# Patient Record
Sex: FEMALE | Race: WHITE
[De-identification: names, ages, dates, MRNs, and addresses within clinical notes are randomized per-mention and may not be internally consistent; named-entity substitution may affect disease eponyms.]

---

## 2017-07-25 ENCOUNTER — HOSPITAL ENCOUNTER (OUTPATIENT)
Dept: HOSPITAL 61 - SURGPAT | Age: 62
Discharge: HOME | End: 2017-07-25
Attending: ORTHOPAEDIC SURGERY
Payer: COMMERCIAL

## 2017-07-25 DIAGNOSIS — Z01.818: Primary | ICD-10-CM

## 2017-07-25 LAB
ALBUMIN SERPL-MCNC: 3.3 G/DL (ref 3.4–5)
ANION GAP SERPL CALC-SCNC: 10 MMOL/L (ref 6–14)
APTT BLD: 29 SEC (ref 24–38)
BASOPHILS # BLD AUTO: 0.1 X10^3/UL (ref 0–0.2)
BASOPHILS NFR BLD: 1 % (ref 0–3)
BUN SERPL-MCNC: 12 MG/DL (ref 7–20)
CALCIUM SERPL-MCNC: 8.3 MG/DL (ref 8.5–10.1)
CHLORIDE SERPL-SCNC: 101 MMOL/L (ref 98–107)
CO2 SERPL-SCNC: 30 MMOL/L (ref 21–32)
CREAT SERPL-MCNC: 1.1 MG/DL (ref 0.6–1)
EOSINOPHIL NFR BLD: 1 % (ref 0–3)
ERYTHROCYTE [DISTWIDTH] IN BLOOD BY AUTOMATED COUNT: 14.4 % (ref 11.5–14.5)
GFR SERPLBLD BASED ON 1.73 SQ M-ARVRAT: 50.3 ML/MIN
GLUCOSE SERPL-MCNC: 132 MG/DL (ref 70–99)
HCT VFR BLD CALC: 41.5 % (ref 36–47)
HGB BLD-MCNC: 13.9 G/DL (ref 12–15.5)
INR PPP: 1 (ref 0.8–1.1)
LYMPHOCYTES # BLD: 2.6 X10^3/UL (ref 1–4.8)
LYMPHOCYTES NFR BLD AUTO: 28 % (ref 24–48)
MCH RBC QN AUTO: 29 PG (ref 25–35)
MCHC RBC AUTO-ENTMCNC: 34 G/DL (ref 31–37)
MCV RBC AUTO: 86 FL (ref 79–100)
MONOCYTES NFR BLD: 5 % (ref 0–9)
NEUTROPHILS NFR BLD AUTO: 65 % (ref 31–73)
PLATELET # BLD AUTO: 200 X10^3/UL (ref 140–400)
POTASSIUM SERPL-SCNC: 3.1 MMOL/L (ref 3.5–5.1)
PROTHROMBIN TIME: 12.7 SEC (ref 11.7–14)
RBC # BLD AUTO: 4.82 X10^6/UL (ref 3.5–5.4)
SODIUM SERPL-SCNC: 141 MMOL/L (ref 136–145)
WBC # BLD AUTO: 9.3 X10^3/UL (ref 4–11)

## 2017-07-25 PROCEDURE — 85027 COMPLETE CBC AUTOMATED: CPT

## 2017-07-25 PROCEDURE — 80048 BASIC METABOLIC PNL TOTAL CA: CPT

## 2017-07-25 PROCEDURE — 85610 PROTHROMBIN TIME: CPT

## 2017-07-25 PROCEDURE — 82040 ASSAY OF SERUM ALBUMIN: CPT

## 2017-07-25 PROCEDURE — 36415 COLL VENOUS BLD VENIPUNCTURE: CPT

## 2017-07-25 PROCEDURE — 93005 ELECTROCARDIOGRAM TRACING: CPT

## 2017-07-25 PROCEDURE — 71020: CPT

## 2017-07-25 PROCEDURE — 87641 MR-STAPH DNA AMP PROBE: CPT

## 2017-07-25 PROCEDURE — 85730 THROMBOPLASTIN TIME PARTIAL: CPT

## 2017-07-25 PROCEDURE — 85651 RBC SED RATE NONAUTOMATED: CPT

## 2017-11-07 ENCOUNTER — HOSPITAL ENCOUNTER (OUTPATIENT)
Dept: HOSPITAL 61 - SURGPAT | Age: 62
Discharge: HOME | End: 2017-11-07
Attending: ORTHOPAEDIC SURGERY
Payer: COMMERCIAL

## 2017-11-07 DIAGNOSIS — Z96.651: ICD-10-CM

## 2017-11-07 DIAGNOSIS — M17.11: ICD-10-CM

## 2017-11-07 DIAGNOSIS — Z01.818: Primary | ICD-10-CM

## 2017-11-07 LAB
ALBUMIN SERPL-MCNC: 3.1 G/DL (ref 3.4–5)
ANION GAP SERPL CALC-SCNC: 10 MMOL/L (ref 6–14)
APTT BLD: 26 SEC (ref 24–38)
BACTERIA #/AREA URNS HPF: (no result) /HPF
BASOPHILS # BLD AUTO: 0.1 X10^3/UL (ref 0–0.2)
BASOPHILS NFR BLD: 1 % (ref 0–3)
BILIRUB UR QL STRIP: NEGATIVE
BUN SERPL-MCNC: 12 MG/DL (ref 7–20)
CALCIUM SERPL-MCNC: 8.7 MG/DL (ref 8.5–10.1)
CHLORIDE SERPL-SCNC: 97 MMOL/L (ref 98–107)
CO2 SERPL-SCNC: 30 MMOL/L (ref 21–32)
CREAT SERPL-MCNC: 1.1 MG/DL (ref 0.6–1)
EOSINOPHIL NFR BLD: 1 % (ref 0–3)
ERYTHROCYTE [DISTWIDTH] IN BLOOD BY AUTOMATED COUNT: 14.4 % (ref 11.5–14.5)
GFR SERPLBLD BASED ON 1.73 SQ M-ARVRAT: 50.2 ML/MIN
GLUCOSE SERPL-MCNC: 195 MG/DL (ref 70–99)
GLUCOSE UR STRIP-MCNC: NEGATIVE MG/DL
HCT VFR BLD CALC: 41 % (ref 36–47)
HGB BLD-MCNC: 13.7 G/DL (ref 12–15.5)
INR PPP: 1.1 (ref 0.8–1.1)
LYMPHOCYTES # BLD: 2.6 X10^3/UL (ref 1–4.8)
LYMPHOCYTES NFR BLD AUTO: 32 % (ref 24–48)
MCH RBC QN AUTO: 29 PG (ref 25–35)
MCHC RBC AUTO-ENTMCNC: 34 G/DL (ref 31–37)
MCV RBC AUTO: 87 FL (ref 79–100)
MONOCYTES NFR BLD: 6 % (ref 0–9)
NEUTROPHILS NFR BLD AUTO: 60 % (ref 31–73)
NITRITE UR QL STRIP: NEGATIVE
PH UR STRIP: 7 [PH]
PLATELET # BLD AUTO: 246 X10^3/UL (ref 140–400)
POTASSIUM SERPL-SCNC: 2.7 MMOL/L (ref 3.5–5.1)
PROT UR STRIP-MCNC: NEGATIVE MG/DL
PROTHROMBIN TIME: 13.3 SEC (ref 11.7–14)
RBC # BLD AUTO: 4.71 X10^6/UL (ref 3.5–5.4)
RBC #/AREA URNS HPF: 0 /HPF (ref 0–2)
SODIUM SERPL-SCNC: 137 MMOL/L (ref 136–145)
SP GR UR STRIP: 1.02
SQUAMOUS #/AREA URNS LPF: (no result) /LPF
UROBILINOGEN UR-MCNC: 1 MG/DL
WBC # BLD AUTO: 8.2 X10^3/UL (ref 4–11)
WBC #/AREA URNS HPF: (no result) /HPF (ref 0–4)

## 2017-11-07 PROCEDURE — 85730 THROMBOPLASTIN TIME PARTIAL: CPT

## 2017-11-07 PROCEDURE — 87086 URINE CULTURE/COLONY COUNT: CPT

## 2017-11-07 PROCEDURE — 81001 URINALYSIS AUTO W/SCOPE: CPT

## 2017-11-07 PROCEDURE — 85651 RBC SED RATE NONAUTOMATED: CPT

## 2017-11-07 PROCEDURE — 85025 COMPLETE CBC W/AUTO DIFF WBC: CPT

## 2017-11-07 PROCEDURE — 36415 COLL VENOUS BLD VENIPUNCTURE: CPT

## 2017-11-07 PROCEDURE — 85610 PROTHROMBIN TIME: CPT

## 2017-11-07 PROCEDURE — 80048 BASIC METABOLIC PNL TOTAL CA: CPT

## 2017-11-07 PROCEDURE — 87641 MR-STAPH DNA AMP PROBE: CPT

## 2017-11-07 PROCEDURE — 82040 ASSAY OF SERUM ALBUMIN: CPT

## 2018-03-12 ENCOUNTER — HOSPITAL ENCOUNTER (OUTPATIENT)
Dept: HOSPITAL 63 - PMG | Age: 63
Discharge: HOME | End: 2018-03-12
Attending: FAMILY MEDICINE
Payer: COMMERCIAL

## 2018-03-12 DIAGNOSIS — R60.0: ICD-10-CM
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
## 2018-03-12 DIAGNOSIS — S89.91XD: Primary | ICD-10-CM

## 2018-03-12 PROCEDURE — 73560 X-RAY EXAM OF KNEE 1 OR 2: CPT

## 2018-03-12 NOTE — RAD
Right knee, 3 views, 3/12/2018:



History: Knee pain since recent injury



A total knee prosthesis is in place in satisfactory position.  No fracture or

dislocation is identified.  There is mild streaky subcutaneous edema.



IMPRESSION:

1.  The right total knee prosthesis is in satisfactory position.

2.  No acute bony abnormality is detected.

## 2020-05-24 ENCOUNTER — HOSPITAL ENCOUNTER (EMERGENCY)
Dept: HOSPITAL 63 - ER | Age: 65
Discharge: HOME | End: 2020-05-24
Payer: MEDICARE

## 2020-05-24 VITALS — HEIGHT: 72 IN | BODY MASS INDEX: 36.73 KG/M2 | WEIGHT: 271.17 LBS

## 2020-05-24 VITALS — DIASTOLIC BLOOD PRESSURE: 57 MMHG | SYSTOLIC BLOOD PRESSURE: 127 MMHG
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
## 2020-05-24 DIAGNOSIS — M25.562: ICD-10-CM

## 2020-05-24 DIAGNOSIS — I10: ICD-10-CM
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
## 2020-05-24 DIAGNOSIS — Z91.041: ICD-10-CM
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
## 2020-05-24 DIAGNOSIS — R07.81: ICD-10-CM

## 2020-05-24 DIAGNOSIS — M25.561: ICD-10-CM

## 2020-05-24 DIAGNOSIS — E11.9: ICD-10-CM

## 2020-05-24 DIAGNOSIS — S93.402A: Primary | ICD-10-CM

## 2020-05-24 PROCEDURE — 71101 X-RAY EXAM UNILAT RIBS/CHEST: CPT

## 2020-05-24 PROCEDURE — 73562 X-RAY EXAM OF KNEE 3: CPT

## 2020-05-24 PROCEDURE — 73610 X-RAY EXAM OF ANKLE: CPT

## 2020-05-24 PROCEDURE — L4350 ANKLE CONTROL ORTHO PRE OTS: HCPCS

## 2020-05-24 PROCEDURE — 99284 EMERGENCY DEPT VISIT MOD MDM: CPT

## 2020-05-24 NOTE — RAD
Single view chest and right-sided rib study dated 5/24/2020.

 

Comparison made to 7/31/2017.

 

CLINICAL INDICATION: Pain after fall.

 

FINDINGS:

 

AP view of chest shows normal heart and mediastinal contours. Lungs are 

somewhat hyperinflated but otherwise clear. No consolidation or pleural 

effusion. No pneumothorax.

 

Dedicated views of the right-sided ribs show no evidence of displaced 

right rib fracture. No acute bony abnormality. Mild degenerative change of

the glenohumeral joint and right AC joint.

 

IMPRESSION:

1. No acute radiographic abnormality. No evidence of displaced right rib 

fracture.

 

Electronically signed by: Peter Vega MD (5/24/2020 9:39 AM) 

[id number]

## 2020-05-24 NOTE — RAD
Three-view left ankle and three-view bilateral knee dated 5/24/2020.

 

No comparison available.

 

Clinical data indication: Pain after injury.

 

FINDINGS:

 

3 views left ankle show normal bony alignment. No displaced fracture. No 

acute osseous or articular abnormality. Bony fragmentation at the tip of 

the lateral malleolus could be related to accessory ossification center or

remote avulsion injury. Small calcaneal spur.

 

3 views of left knee show normal bony alignment. There is evidence of 

prior total knee arthroplasty. Femoral and tibial components are intact. 

No periprosthetic fracture or malalignment. No apparent joint effusion. 

Small loose body in the anterior joint space.

 

3 views of the right knee show normal bony alignment. Evidence of prior 

total knee arthroplasty. Femoral and tibial components are intact. No 

periprosthetic fracture or malalignment. No apparent joint effusion or 

loose body.

 

IMPRESSION:

1. No acute bony abnormality.

2. Status post bilateral total knee arthroplasty. Small loose body at the 

anterior joint space on the left.

 

Electronically signed by: Peter Vega MD (5/24/2020 9:37 AM) 

[id number]

## 2020-05-24 NOTE — PHYS DOC
Past History


Past Medical History:  Diabetes, Hypertension


Past Surgical History:  Appendectomy, Cholecystectomy, Hysterectomy, Knee 

Replacement


Alcohol Use:  None





General Adult


EDM:


Chief Complaint:  MECHANICAL FALL





HPI:


HPI:


64-year-old female presents after a fall at home.  Last night she took a wrong 

step off of the porch and fell onto .  She currently has left 

ankle pain, bilateral knee pain, and right upper rib pain.  The patient was not 

going to come into the hospital, but she does not feel like she can put weight 

on her left ankle and her daughter encouraged her to come in.  The patient does 

have bilateral knee replacements and is worried about disrupting those.  She 

denies hitting her head or loss of consciousness.  She denies numbness, 

tingling, altered sensation.  She has no other complaints at this time.





Review of Systems:


Review of Systems:


Constitutional:  Denies fever or chills 


Eyes:  Denies change in visual acuity 


HENT:  Denies nasal congestion or sore throat 


Respiratory:  Denies cough or shortness of breath 


Cardiovascular:  Denies chest pain or edema 


GI:  Denies abdominal pain, nausea, vomiting, bloody stools or diarrhea 


:  Denies dysuria 


Musculoskeletal: Left ankle pain, bilateral knee pain, right rib pain


Integument:  Denies rash 


Neurologic:  Denies headache, focal weakness or sensory changes 


Endocrine:  Denies polyuria or polydipsia 


Lymphatic:  Denies swollen glands 


Psychiatric:  Denies depression or anxiety





Heart Score:


Risk Factors:


Risk Factors:  DM, Current or recent (<one month) smoker, HTN, HLP, family 

history of CAD, obesity.


Risk Scores:


Score 0 - 3:  2.5% MACE over next 6 weeks - Discharge Home


Score 4 - 6:  20.3% MACE over next 6 weeks - Admit for Clinical Observation


Score 7 - 10:  72.7% MACE over next 6 weeks - Early Invasive Strategies





Allergies:


Allergies:





Allergies








Coded Allergies Type Severity Reaction Last Updated Verified


 


  Iodinated Contrast- Oral and IV Dye Allergy Unknown  12/30/13 Yes











Physical Exam:


PE:





Constitutional: Well developed, obese, well nourished, no acute distress, non-

toxic appearance. []


HENT: Normocephalic, atraumatic, bilateral external ears normal, oropharynx 

moist, no oral exudates, nose normal. []


Eyes: PERRLA, EOMI, conjunctiva normal, no discharge. [] 


Neck: Normal range of motion, no tenderness, supple, no stridor. [] 


Cardiovascular: Heart rate regular rhythm, no murmur []


Lungs & Thorax:  Bilateral breath sounds clear to auscultation.  Mild mid right 

rib pain []


Abdomen: Bowel sounds normal, soft, no tenderness, no masses, no pulsatile 

masses. [] 


Skin: Warm, dry, no erythema, no rash. [] 


Back: No tenderness, no CVA tenderness. [] 


Extremities: Left ankle swelling tenderness, no obvious deformity.  No obvious 

deformity of the knees.  [] 


Neurologic: Alert and oriented X 3, normal motor function, normal sensory 

function, no focal deficits noted. []


Psychologic: Affect normal, judgement normal, mood normal. []





Current Patient Data:


Vital Signs:





                                   Vital Signs








  Date Time  Temp Pulse Resp B/P (MAP) Pulse Ox O2 Delivery O2 Flow Rate FiO2


 


5/24/20 08:51 98.6 85 18 144/78 (100) 98 Room Air  











EKG:


EKG:


[]





Radiology/Procedures:


Radiology/Procedures:


[]


Impressions:


Three-view left ankle and three-view bilateral knee dated 5/24/2020.


 


No comparison available.


 


Clinical data indication: Pain after injury.


 


FINDINGS:


 


3 views left ankle show normal bony alignment. No displaced fracture. No 


acute osseous or articular abnormality. Bony fragmentation at the tip of 


the lateral malleolus could be related to accessory ossification center or


remote avulsion injury. Small calcaneal spur.


 


3 views of left knee show normal bony alignment. There is evidence of 


prior total knee arthroplasty. Femoral and tibial components are intact. 


No periprosthetic fracture or malalignment. No apparent joint effusion. 


Small loose body in the anterior joint space.


 


3 views of the right knee show normal bony alignment. Evidence of prior 


total knee arthroplasty. Femoral and tibial components are intact. No 


periprosthetic fracture or malalignment. No apparent joint effusion or 


loose body.


 


IMPRESSION:


1. No acute bony abnormality.


2. Status post bilateral total knee arthroplasty. Small loose body at the 


anterior joint space on the left.


 


Electronically signed by: Kanu Vega MD (5/24/2020 9:37 AM) 


[id number]














DICTATED AND SIGNED BY:     KANU VEGA MD


DATE:     05/24/20 0937





CC: CHRISSY MAY DO; HILARY MARES MD ~











Single view chest and right-sided rib study dated 5/24/2020.


 


Comparison made to 7/31/2017.


 


CLINICAL INDICATION: Pain after fall.


 


FINDINGS:


 


AP view of chest shows normal heart and mediastinal contours. Lungs are 


somewhat hyperinflated but otherwise clear. No consolidation or pleural 


effusion. No pneumothorax.


 


Dedicated views of the right-sided ribs show no evidence of displaced 


right rib fracture. No acute bony abnormality. Mild degenerative change of


the glenohumeral joint and right AC joint.


 


IMPRESSION:


1. No acute radiographic abnormality. No evidence of displaced right rib 


fracture.


 


Electronically signed by: Kanu Vega MD (5/24/2020 9:39 AM) 


[id number]














DICTATED AND SIGNED BY:     KANU VEGA MD


DATE:     05/24/20 0939





CC: CHRISSY MAY DO; HILARY MARES MD ~





Course & Med Decision Making:


Course & Med Decision Making


Pertinent Labs and Imaging studies reviewed. (See chart for details)


The patient's x-rays are negative for fracture.  Her knee replacements appear to

 be in good alignment.  I have given her 1 Norco 5/325 for her discomfort in the

 ER.  We will give her an air splint for her ankle.  I will also discharge her 

on a short course of Norco.  She is stable for discharge at this time.


[]





Dragon Disclaimer:


Dragon Disclaimer:


This electronic medical record was generated, in whole or in part, using a voice

 recognition dictation system.





Departure


Departure:


Impression:  


   Primary Impression:  


   Fall from slip, trip, or stumble


   Qualified Codes:  W01.0XXA - Fall on same level from slipping, tripping and 

   stumbling without subsequent striking against object, initial encounter


   Additional Impression:  


   Left ankle sprain


   Qualified Codes:  S93.402A - Sprain of unspecified ligament of left ankle, 

   initial encounter


Disposition:  01 HOME/RESIDENCE PRIOR TO ADM


Condition:  STABLE


Referrals:  


HILARY MARES MD (PCP)


Patient Instructions:  Ankle Sprain, Acute, with Phase I Rehab-[facility]


Scripts


Hydrocodone Bit/Acetaminophen (NORCO 5-325 TABLET) 1 Each Tablet


1 TAB PO PRN Q6HRS PRN for PAIN, #10 TAB 0 Refills


   Prov: CHRISSY MAY DO         5/24/20











CHRISSY MAY DO                 May 24, 2020 09:05